# Patient Record
Sex: FEMALE | Race: WHITE | NOT HISPANIC OR LATINO
[De-identification: names, ages, dates, MRNs, and addresses within clinical notes are randomized per-mention and may not be internally consistent; named-entity substitution may affect disease eponyms.]

---

## 2021-03-08 PROBLEM — Z00.00 ENCOUNTER FOR PREVENTIVE HEALTH EXAMINATION: Status: ACTIVE | Noted: 2021-03-08

## 2021-03-15 ENCOUNTER — NON-APPOINTMENT (OUTPATIENT)
Age: 51
End: 2021-03-15

## 2021-03-15 DIAGNOSIS — K25.9 GASTRIC ULCER, UNSPECIFIED AS ACUTE OR CHRONIC, W/OUT HEMORRHAGE OR PERFORATION: ICD-10-CM

## 2021-03-16 DIAGNOSIS — Z01.812 ENCOUNTER FOR PREPROCEDURAL LABORATORY EXAMINATION: ICD-10-CM

## 2021-03-31 ENCOUNTER — TRANSCRIPTION ENCOUNTER (OUTPATIENT)
Age: 51
End: 2021-03-31

## 2021-04-10 ENCOUNTER — APPOINTMENT (OUTPATIENT)
Dept: DISASTER EMERGENCY | Facility: CLINIC | Age: 51
End: 2021-04-10

## 2021-04-10 DIAGNOSIS — Z01.818 ENCOUNTER FOR OTHER PREPROCEDURAL EXAMINATION: ICD-10-CM

## 2021-04-11 LAB — SARS-COV-2 N GENE NPH QL NAA+PROBE: NOT DETECTED

## 2021-04-11 NOTE — PRE PROCEDURE NOTE - PRE PROCEDURE EVALUATION
Attending Physician:     Kayce                       Procedure: EGD EUS    Indication for Procedure: gastric ulcer  ________________________________________________________  PAST MEDICAL & SURGICAL HISTORY:    ALLERGIES:  Allergy Status Unknown    HOME MEDICATIONS:    AICD/PPM: [ ] yes   [ ] no    PERTINENT LAB DATA:                      PHYSICAL EXAMINATION:    T(C): --  HR: --  BP: --  RR: --  SpO2: --    Constitutional: NAD  HEENT: PERRLA, EOMI,    Neck:  No JVD  Respiratory: CTAB/L  Cardiovascular: S1 and S2  Gastrointestinal: BS+, soft, NT/ND  Extremities: No peripheral edema  Neurological: A/O x 3, no focal deficits  Psychiatric: Normal mood, normal affect  Skin: No rashes    ASA Class: I [ ]  II [ ]  III [ ]  IV [ ]    COMMENTS:    The patient is a suitable candidate for the planned procedure unless box checked [ ]  No, explain:

## 2021-04-13 ENCOUNTER — OUTPATIENT (OUTPATIENT)
Dept: OUTPATIENT SERVICES | Facility: HOSPITAL | Age: 51
LOS: 1 days | End: 2021-04-13
Payer: COMMERCIAL

## 2021-04-13 ENCOUNTER — APPOINTMENT (OUTPATIENT)
Dept: GASTROENTEROLOGY | Facility: HOSPITAL | Age: 51
End: 2021-04-13

## 2021-04-13 ENCOUNTER — RESULT REVIEW (OUTPATIENT)
Age: 51
End: 2021-04-13

## 2021-04-13 VITALS
HEART RATE: 76 BPM | SYSTOLIC BLOOD PRESSURE: 138 MMHG | RESPIRATION RATE: 23 BRPM | DIASTOLIC BLOOD PRESSURE: 90 MMHG | OXYGEN SATURATION: 100 %

## 2021-04-13 VITALS
HEIGHT: 62 IN | WEIGHT: 149.91 LBS | HEART RATE: 79 BPM | SYSTOLIC BLOOD PRESSURE: 142 MMHG | RESPIRATION RATE: 13 BRPM | TEMPERATURE: 97 F | DIASTOLIC BLOOD PRESSURE: 81 MMHG | OXYGEN SATURATION: 98 %

## 2021-04-13 DIAGNOSIS — K25.9 GASTRIC ULCER, UNSPECIFIED AS ACUTE OR CHRONIC, WITHOUT HEMORRHAGE OR PERFORATION: ICD-10-CM

## 2021-04-13 DIAGNOSIS — S39.92XA UNSPECIFIED INJURY OF LOWER BACK, INITIAL ENCOUNTER: Chronic | ICD-10-CM

## 2021-04-13 DIAGNOSIS — S43.401A UNSPECIFIED SPRAIN OF RIGHT SHOULDER JOINT, INITIAL ENCOUNTER: Chronic | ICD-10-CM

## 2021-04-13 PROCEDURE — 88305 TISSUE EXAM BY PATHOLOGIST: CPT | Mod: 26

## 2021-04-13 PROCEDURE — 43259 EGD US EXAM DUODENUM/JEJUNUM: CPT | Mod: GC

## 2021-04-13 PROCEDURE — 43239 EGD BIOPSY SINGLE/MULTIPLE: CPT | Mod: GC

## 2021-04-13 RX ORDER — SODIUM CHLORIDE 9 MG/ML
500 INJECTION INTRAMUSCULAR; INTRAVENOUS; SUBCUTANEOUS
Refills: 0 | Status: DISCONTINUED | OUTPATIENT
Start: 2021-04-13 | End: 2021-04-27

## 2021-04-13 NOTE — PRE-ANESTHESIA EVALUATION ADULT - NSANTHOSAYNRD_GEN_A_CORE
No. LEXIS screening performed.  STOP BANG Legend: 0-2 = LOW Risk; 3-4 = INTERMEDIATE Risk; 5-8 = HIGH Risk

## 2021-04-13 NOTE — ASU PATIENT PROFILE, ADULT - PSH
Sprain of right shoulder  rotator cuff repaIr rotator cuff surgery 2014  Unspecified injury of lower back, initial encounter  herniated disc repair surgery

## 2021-04-14 PROCEDURE — 88305 TISSUE EXAM BY PATHOLOGIST: CPT

## 2021-04-14 PROCEDURE — 43259 EGD US EXAM DUODENUM/JEJUNUM: CPT

## 2021-04-14 PROCEDURE — 43239 EGD BIOPSY SINGLE/MULTIPLE: CPT

## 2022-06-05 NOTE — ASU PATIENT PROFILE, ADULT - SPIRITUAL CULTURAL, CURRENT SITUATION, PROFILE
Pt presents to the ED via dizziness. Pt reports that she had a syncopal episode a couple weeks ago after seeing blood. Pt reports feeling like the room is spinning. Pt also felt some weakness and nausea. None

## 2023-02-16 ENCOUNTER — OUTPATIENT (OUTPATIENT)
Dept: OUTPATIENT SERVICES | Facility: HOSPITAL | Age: 53
LOS: 1 days | End: 2023-02-16
Payer: COMMERCIAL

## 2023-02-16 ENCOUNTER — APPOINTMENT (OUTPATIENT)
Dept: NUCLEAR MEDICINE | Facility: HOSPITAL | Age: 53
End: 2023-02-16

## 2023-02-16 DIAGNOSIS — S39.92XA UNSPECIFIED INJURY OF LOWER BACK, INITIAL ENCOUNTER: Chronic | ICD-10-CM

## 2023-02-16 DIAGNOSIS — S43.401A UNSPECIFIED SPRAIN OF RIGHT SHOULDER JOINT, INITIAL ENCOUNTER: Chronic | ICD-10-CM

## 2023-02-16 PROCEDURE — 78830 RP LOCLZJ TUM SPECT W/CT 1: CPT | Mod: 26

## 2023-02-16 PROCEDURE — 78830 RP LOCLZJ TUM SPECT W/CT 1: CPT

## 2023-02-28 ENCOUNTER — TRANSCRIPTION ENCOUNTER (OUTPATIENT)
Age: 53
End: 2023-02-28

## 2023-05-22 ENCOUNTER — INPATIENT (INPATIENT)
Facility: HOSPITAL | Age: 53
LOS: 0 days | Discharge: HOME | DRG: 206 | End: 2023-05-23
Attending: STUDENT IN AN ORGANIZED HEALTH CARE EDUCATION/TRAINING PROGRAM | Admitting: INTERNAL MEDICINE
Payer: COMMERCIAL

## 2023-05-22 VITALS
OXYGEN SATURATION: 100 % | SYSTOLIC BLOOD PRESSURE: 144 MMHG | DIASTOLIC BLOOD PRESSURE: 90 MMHG | RESPIRATION RATE: 18 BRPM | TEMPERATURE: 98 F | HEIGHT: 62 IN | HEART RATE: 76 BPM | WEIGHT: 160.06 LBS

## 2023-05-22 DIAGNOSIS — S43.401A UNSPECIFIED SPRAIN OF RIGHT SHOULDER JOINT, INITIAL ENCOUNTER: Chronic | ICD-10-CM

## 2023-05-22 DIAGNOSIS — Z29.9 ENCOUNTER FOR PROPHYLACTIC MEASURES, UNSPECIFIED: ICD-10-CM

## 2023-05-22 DIAGNOSIS — Z98.890 OTHER SPECIFIED POSTPROCEDURAL STATES: Chronic | ICD-10-CM

## 2023-05-22 DIAGNOSIS — M79.7 FIBROMYALGIA: ICD-10-CM

## 2023-05-22 DIAGNOSIS — R07.9 CHEST PAIN, UNSPECIFIED: ICD-10-CM

## 2023-05-22 DIAGNOSIS — M06.9 RHEUMATOID ARTHRITIS, UNSPECIFIED: ICD-10-CM

## 2023-05-22 DIAGNOSIS — S39.92XA UNSPECIFIED INJURY OF LOWER BACK, INITIAL ENCOUNTER: Chronic | ICD-10-CM

## 2023-05-22 DIAGNOSIS — R06.02 SHORTNESS OF BREATH: ICD-10-CM
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
## 2023-05-22 LAB
ALBUMIN SERPL ELPH-MCNC: 3.9 G/DL — SIGNIFICANT CHANGE UP (ref 3.3–5)
ALP SERPL-CCNC: 62 U/L — SIGNIFICANT CHANGE UP (ref 40–120)
ALT FLD-CCNC: 12 U/L — SIGNIFICANT CHANGE UP (ref 10–45)
ANION GAP SERPL CALC-SCNC: 10 MMOL/L — SIGNIFICANT CHANGE UP (ref 5–17)
AST SERPL-CCNC: 19 U/L — SIGNIFICANT CHANGE UP (ref 10–40)
BASOPHILS # BLD AUTO: 0.04 K/UL — SIGNIFICANT CHANGE UP (ref 0–0.2)
BASOPHILS NFR BLD AUTO: 0.5 % — SIGNIFICANT CHANGE UP (ref 0–2)
BILIRUB SERPL-MCNC: 0.4 MG/DL — SIGNIFICANT CHANGE UP (ref 0.2–1.2)
BUN SERPL-MCNC: 7 MG/DL — SIGNIFICANT CHANGE UP (ref 7–23)
CALCIUM SERPL-MCNC: 8.9 MG/DL — SIGNIFICANT CHANGE UP (ref 8.4–10.5)
CHLORIDE SERPL-SCNC: 104 MMOL/L — SIGNIFICANT CHANGE UP (ref 96–108)
CO2 SERPL-SCNC: 23 MMOL/L — SIGNIFICANT CHANGE UP (ref 22–31)
CREAT SERPL-MCNC: 0.54 MG/DL — SIGNIFICANT CHANGE UP (ref 0.5–1.3)
EGFR: 109 ML/MIN/1.73M2 — SIGNIFICANT CHANGE UP
EOSINOPHIL # BLD AUTO: 0.34 K/UL — SIGNIFICANT CHANGE UP (ref 0–0.5)
EOSINOPHIL NFR BLD AUTO: 4.3 % — SIGNIFICANT CHANGE UP (ref 0–6)
GLUCOSE SERPL-MCNC: 109 MG/DL — HIGH (ref 70–99)
HCT VFR BLD CALC: 38.5 % — SIGNIFICANT CHANGE UP (ref 34.5–45)
HGB BLD-MCNC: 12.9 G/DL — SIGNIFICANT CHANGE UP (ref 11.5–15.5)
IMM GRANULOCYTES NFR BLD AUTO: 0.3 % — SIGNIFICANT CHANGE UP (ref 0–0.9)
LYMPHOCYTES # BLD AUTO: 1.76 K/UL — SIGNIFICANT CHANGE UP (ref 1–3.3)
LYMPHOCYTES # BLD AUTO: 22.1 % — SIGNIFICANT CHANGE UP (ref 13–44)
MCHC RBC-ENTMCNC: 30.9 PG — SIGNIFICANT CHANGE UP (ref 27–34)
MCHC RBC-ENTMCNC: 33.5 GM/DL — SIGNIFICANT CHANGE UP (ref 32–36)
MCV RBC AUTO: 92.1 FL — SIGNIFICANT CHANGE UP (ref 80–100)
MONOCYTES # BLD AUTO: 0.45 K/UL — SIGNIFICANT CHANGE UP (ref 0–0.9)
MONOCYTES NFR BLD AUTO: 5.7 % — SIGNIFICANT CHANGE UP (ref 2–14)
NEUTROPHILS # BLD AUTO: 5.35 K/UL — SIGNIFICANT CHANGE UP (ref 1.8–7.4)
NEUTROPHILS NFR BLD AUTO: 67.1 % — SIGNIFICANT CHANGE UP (ref 43–77)
NRBC # BLD: 0 /100 WBCS — SIGNIFICANT CHANGE UP (ref 0–0)
NT-PROBNP SERPL-SCNC: 121 PG/ML — SIGNIFICANT CHANGE UP (ref 0–300)
PLATELET # BLD AUTO: 321 K/UL — SIGNIFICANT CHANGE UP (ref 150–400)
POTASSIUM SERPL-MCNC: 3.8 MMOL/L — SIGNIFICANT CHANGE UP (ref 3.5–5.3)
POTASSIUM SERPL-SCNC: 3.8 MMOL/L — SIGNIFICANT CHANGE UP (ref 3.5–5.3)
PROT SERPL-MCNC: 6.7 G/DL — SIGNIFICANT CHANGE UP (ref 6–8.3)
RAPID RVP RESULT: SIGNIFICANT CHANGE UP
RBC # BLD: 4.18 M/UL — SIGNIFICANT CHANGE UP (ref 3.8–5.2)
RBC # FLD: 13.3 % — SIGNIFICANT CHANGE UP (ref 10.3–14.5)
SARS-COV-2 RNA SPEC QL NAA+PROBE: SIGNIFICANT CHANGE UP
SODIUM SERPL-SCNC: 137 MMOL/L — SIGNIFICANT CHANGE UP (ref 135–145)
TROPONIN T SERPL-MCNC: 0.01 NG/ML — SIGNIFICANT CHANGE UP (ref 0–0.01)
TROPONIN T SERPL-MCNC: 0.01 NG/ML — SIGNIFICANT CHANGE UP (ref 0–0.01)
WBC # BLD: 7.96 K/UL — SIGNIFICANT CHANGE UP (ref 3.8–10.5)
WBC # FLD AUTO: 7.96 K/UL — SIGNIFICANT CHANGE UP (ref 3.8–10.5)

## 2023-05-22 PROCEDURE — 99223 1ST HOSP IP/OBS HIGH 75: CPT | Mod: GC

## 2023-05-22 PROCEDURE — 71275 CT ANGIOGRAPHY CHEST: CPT | Mod: 26,MA

## 2023-05-22 PROCEDURE — 71045 X-RAY EXAM CHEST 1 VIEW: CPT | Mod: 26

## 2023-05-22 PROCEDURE — 99285 EMERGENCY DEPT VISIT HI MDM: CPT

## 2023-05-22 RX ORDER — KETOROLAC TROMETHAMINE 30 MG/ML
30 SYRINGE (ML) INJECTION ONCE
Refills: 0 | Status: DISCONTINUED | OUTPATIENT
Start: 2023-05-22 | End: 2023-05-22

## 2023-05-22 RX ORDER — IPRATROPIUM/ALBUTEROL SULFATE 18-103MCG
3 AEROSOL WITH ADAPTER (GRAM) INHALATION EVERY 6 HOURS
Refills: 0 | Status: DISCONTINUED | OUTPATIENT
Start: 2023-05-22 | End: 2023-05-23

## 2023-05-22 RX ORDER — CELECOXIB 200 MG/1
200 CAPSULE ORAL DAILY
Refills: 0 | Status: DISCONTINUED | OUTPATIENT
Start: 2023-05-22 | End: 2023-05-23

## 2023-05-22 RX ORDER — FAMOTIDINE 10 MG/ML
1 INJECTION INTRAVENOUS
Qty: 0 | Refills: 0 | DISCHARGE

## 2023-05-22 RX ORDER — IPRATROPIUM/ALBUTEROL SULFATE 18-103MCG
3 AEROSOL WITH ADAPTER (GRAM) INHALATION ONCE
Refills: 0 | Status: COMPLETED | OUTPATIENT
Start: 2023-05-22 | End: 2023-05-22

## 2023-05-22 RX ORDER — ACETAMINOPHEN 500 MG
1000 TABLET ORAL ONCE
Refills: 0 | Status: COMPLETED | OUTPATIENT
Start: 2023-05-22 | End: 2023-05-22

## 2023-05-22 RX ORDER — ACETAMINOPHEN 500 MG
650 TABLET ORAL EVERY 6 HOURS
Refills: 0 | Status: DISCONTINUED | OUTPATIENT
Start: 2023-05-22 | End: 2023-05-23

## 2023-05-22 RX ORDER — DEXLANSOPRAZOLE 30 MG/1
1 CAPSULE, DELAYED RELEASE ORAL
Qty: 0 | Refills: 0 | DISCHARGE

## 2023-05-22 RX ORDER — PANTOPRAZOLE SODIUM 20 MG/1
40 TABLET, DELAYED RELEASE ORAL
Refills: 0 | Status: DISCONTINUED | OUTPATIENT
Start: 2023-05-22 | End: 2023-05-23

## 2023-05-22 RX ORDER — TRAMADOL HYDROCHLORIDE 50 MG/1
25 TABLET ORAL EVERY 12 HOURS
Refills: 0 | Status: DISCONTINUED | OUTPATIENT
Start: 2023-05-22 | End: 2023-05-23

## 2023-05-22 RX ORDER — HYDROXYCHLOROQUINE SULFATE 200 MG
1 TABLET ORAL
Refills: 0 | DISCHARGE

## 2023-05-22 RX ORDER — HYDROXYCHLOROQUINE SULFATE 200 MG
200 TABLET ORAL DAILY
Refills: 0 | Status: DISCONTINUED | OUTPATIENT
Start: 2023-05-22 | End: 2023-05-23

## 2023-05-22 RX ORDER — SUCRALFATE 1 G
0 TABLET ORAL
Qty: 0 | Refills: 0 | DISCHARGE

## 2023-05-22 RX ADMIN — Medication 3 MILLILITER(S): at 13:49

## 2023-05-22 RX ADMIN — Medication 400 MILLIGRAM(S): at 17:49

## 2023-05-22 RX ADMIN — Medication 1 MILLIGRAM(S): at 20:18

## 2023-05-22 RX ADMIN — Medication 30 MILLIGRAM(S): at 17:51

## 2023-05-22 RX ADMIN — Medication 40 MILLIGRAM(S): at 20:17

## 2023-05-22 RX ADMIN — Medication 3 MILLILITER(S): at 13:29

## 2023-05-22 RX ADMIN — Medication 125 MILLIGRAM(S): at 13:29

## 2023-05-22 RX ADMIN — Medication 3 MILLILITER(S): at 20:23

## 2023-05-22 RX ADMIN — Medication 3 MILLILITER(S): at 14:09

## 2023-05-22 NOTE — H&P ADULT - ASSESSMENT
53F PMH of RA and fibromyalgia who presents with 2 days of acute onset chest pain and SOB, admitted for further evaluation and management.

## 2023-05-22 NOTE — H&P ADULT - PROBLEM SELECTOR PLAN 1
Patient presenting with acute onset chest pain and difficulty breathing x2 days. Reports chest pain is dull, localized to center of chest and worse with inspiration. Associated SOB and non-productive cough. No personal history of asthma but does have family history of asthma. Never smoker. Denies chemical or fume exposure in past.  -CBC and CMP wnl  -ESR mildly elevated at 36; CRP wnl   -trops negative; proBNP wnl   -RVP negative   -peak flow rate in    -EKG NSR  -CXR no acute pathology  -CTA no PE or consolidation; Left lung base linear atelectasis  -s/p IV tylenol 1g x1, toradol 30mg IVP x1, ativan 1mg IVP x1, solumedrol 125mg IVP x1, prednisone 40mg PO x1, and duonebs x4 in ED with minimal improvement in symptoms   -unclear etiology at this time however ddx includes asthma/reactive airway disease, pericarditis, possible small PE not seen on CT scan, GERD/esophageal spasm, fibromyalgia pain, possible autoimmune etiology, pulm fibrosis related to RA  -f/u TTE to r/o cardiac cause   -monitor peak expiratory flow rate daily   -consider pulm consult in AM   -tylenol prn pain Patient presenting with acute onset chest pain and difficulty breathing x2 days. Reports chest pain is dull, localized to center of chest and worse with inspiration. Associated SOB and non-productive cough. No personal history of asthma but does have family history of asthma. Never smoker. Denies chemical or fume exposure in past.  -CBC and CMP wnl  -ESR mildly elevated at 36; CRP wnl   -trops negative; proBNP wnl   -RVP negative   -peak flow rate in    -EKG NSR  -CXR no acute pathology  -CTA no PE or consolidation; Left lung base linear atelectasis  -s/p IV tylenol 1g x1, toradol 30mg IVP x1, ativan 1mg IVP x1, solumedrol 125mg IVP x1, prednisone 40mg PO x1, and duonebs x4 in ED with minimal improvement in symptoms   -unclear etiology at this time however ddx includes asthma/reactive airway disease, pericarditis, possible small PE not seen on CT scan, GERD/esophageal spasm, fibromyalgia pain, possible autoimmune etiology, pulm fibrosis related to RA  -f/u TTE to r/o cardiac cause   -f/u CONSUELO, RF, scleroderma Abs   -monitor peak expiratory flow rate daily   -consider pulm consult in AM   -tylenol prn pain Patient presenting with acute onset chest pain and difficulty breathing x2 days. Reports SOB worse with exertion and associated with dry cough. No personal history of asthma but does have family history of asthma. Never smoker. Denies chemical or fume exposure in past.  -CBC and CMP wnl  -ESR mildly elevated at 36; CRP wnl   -trops negative; proBNP wnl   -RVP negative   -peak flow rate in    -EKG NSR   -CXR no acute pathology  -CTA no PE or consolidation; Left lung base linear atelectasis  -s/p IV tylenol 1g x1, toradol 30mg IVP x1, ativan 1mg IVP x1, solumedrol 125mg IVP x1, prednisone 40mg PO x1, and duonebs x4 in ED with minimal improvement in symptoms   -unclear etiology at this time however ddx includes asthma/bronchitis/reactive airway disease, viral infection, pericarditis, possible small PE not seen on CT scan, GERD/esophageal spasm, fibromyalgia pain, possible autoimmune etiology, pulm fibrosis related to RA  -f/u CONSUELO, RF, scleroderma Abs, anti-dsDNA  -monitor peak expiratory flow rate daily   -consider pulm consult in AM   -tessalon prn cough   -duonebs q6 standing   -incentive spirometry   -hold off on high dose steroids for now, can consider adding if symptoms worsen

## 2023-05-22 NOTE — ED PROVIDER NOTE - CLINICAL SUMMARY MEDICAL DECISION MAKING FREE TEXT BOX
54 y/o F with atypical chest pain, SOB, and dry nonproductive cough x3 days. Pt found to have new wheezing on exam with no personal h/o reactive airway disease. Suspect viral illness with reactive airway. Also consider autoimmune process. Pt empirically given nebs and steroids. EKG is nonischemic and troponin is negative, doubt ACS. CXR with no pneumonia and ?nodule. If radiologist agrees, will consider CT to evaluate this. D-dimer sent. If elevated, will send for CTA to evaluate for PE. Dispo pending work up and reevaluation. 54 y/o F with atypical chest pain, SOB, and dry nonproductive cough x3 days. Pt found to have new wheezing on exam with no personal h/o reactive airway disease. Suspect viral illness with reactive airway. Also consider autoimmune process. Pt empirically given nebs and steroids. EKG is nonischemic and troponin is negative, doubt ACS. CXR with no pneumonia and ?nodule. If radiologist agrees, will consider CT to evaluate this. D-dimer sent. If elevated, will send for CTA to evaluate for PE. Dispo pending work up and reevaluation.    repeat exam wheezing mostly resolved but heart at bases  , now w diffuse rhonchi, pt still appears dyspneic, pt waiting for CTA to r/o PE, added on BNP for further CHF evaluation, consider pericarditis, , ESR elevated.   dispo pending CT and reevaluation. 54 y/o F with atypical chest pain, SOB, and dry nonproductive cough x3 days. Pt found to have new wheezing on exam with no personal h/o reactive airway disease. Suspect viral illness with reactive airway. Also consider autoimmune process. Pt empirically given nebs and steroids. EKG is nonischemic and troponin is negative, doubt ACS. CXR with no pneumonia and ?nodule. If radiologist agrees, will consider CT to evaluate this. D-dimer sent. If elevated, will send for CTA to evaluate for PE. Dispo pending work up and reevaluation.    repeat exam wheezing mostly resolved but heart at bases  , now w diffuse rhonchi, pt still appears dyspneic, pt waiting for CTA to r/o PE, added on BNP for further CHF evaluation, consider pericarditis, , ESR elevated.   dispo pending CT and reevaluation.    pt remains dyspneic , tachy w exertion, will admit, discussed w cardiology who advised ok for floor, would consider echo, possible further ACS eval, pulm evaluation.

## 2023-05-22 NOTE — ED ADULT NURSE NOTE - OBJECTIVE STATEMENT
AOX4. VSS. Patient came to the ED from [facility], after complaining of chest pain and occasional SOB for the past two days. Denies cardiac history.

## 2023-05-22 NOTE — H&P ADULT - NSICDXPASTSURGICALHX_GEN_ALL_CORE_FT
PAST SURGICAL HISTORY:  S/P discectomy     S/P rotator cuff surgery     Sprain of right shoulder rotator cuff repaIr rotator cuff surgery 2014    Unspecified injury of lower back, initial encounter herniated disc repair surgery

## 2023-05-22 NOTE — H&P ADULT - PROBLEM SELECTOR PLAN 4
F: none   E: replete as needed  N: regular diet   DVT ppx:   Full Code  Dispo: [facility] Patient with history of fibromyalgia on home meloxicam 15mg qd and pregabalin 25mg qd   -c/w home meloxicam and pregabalin Patient with history of fibromyalgia on home meloxicam 15mg qd and pregabalin 25mg qd   -c/w home meloxicam (celecoxib therapeutic interchange) and pregabalin

## 2023-05-22 NOTE — ED PROVIDER NOTE - NSICDXPASTSURGICALHX_GEN_ALL_CORE_FT
PAST SURGICAL HISTORY:  Sprain of right shoulder rotator cuff repaIr rotator cuff surgery 2014    Unspecified injury of lower back, initial encounter herniated disc repair surgery

## 2023-05-22 NOTE — H&P ADULT - NSHPLABSRESULTS_GEN_ALL_CORE
.  LABS:                         12.9   7.96  )-----------( 321      ( 22 May 2023 12:06 )             38.5     05-22    137  |  104  |  7   ----------------------------<  109<H>  3.8   |  23  |  0.54    Ca    8.9      22 May 2023 12:06    TPro  6.7  /  Alb  3.9  /  TBili  0.4  /  DBili  x   /  AST  19  /  ALT  12  /  AlkPhos  62  05-22    PT/INR - ( 22 May 2023 12:06 )   PT: 11.1 sec;   INR: 0.93          PTT - ( 22 May 2023 12:06 )  PTT:32.1 sec          RADIOLOGY, EKG & ADDITIONAL TESTS: Reviewed.

## 2023-05-22 NOTE — H&P ADULT - NSHPPHYSICALEXAM_GEN_ALL_CORE
VITALS:   T(C): 36.7 (05-22-23 @ 20:05), Max: 37.2 (05-22-23 @ 13:46)  HR: 123 (05-22-23 @ 20:05) (75 - 123)  BP: 132/82 (05-22-23 @ 20:05) (132/82 - 155/88)  RR: 26 (05-22-23 @ 20:05) (18 - 26)  SpO2: 99% (05-22-23 @ 20:05) (99% - 100%)    GENERAL: NAD, lying in bed comfortably  HEAD:  Atraumatic, normocephalic  EYES: EOMI, PERRLA, conjunctiva and sclera clear  ENT: Moist mucous membranes  NECK: Supple, no JVD  HEART: Regular rate and rhythm, no murmurs, rubs, or gallops  LUNGS: Comfortable on RA; no tachypnea however + conversational dyspnea. Deep expiratory wheezes vs ?rhonchi b/l.  ABDOMEN: Soft, nontender, nondistended, +BS  EXTREMITIES: 2+ peripheral pulses bilaterally. No clubbing, cyanosis, or edema  NERVOUS SYSTEM:  A&Ox3, no focal deficits   SKIN: No rashes or lesions

## 2023-05-22 NOTE — H&P ADULT - PROBLEM SELECTOR PLAN 2
Patient with history of RA on home hydroxychloroquine 200mg qd and methylprednisolone 4mg qd   -c/w home hydroxychloroquine and methylprednisolone Patient presenting with acute onset chest pain and difficulty breathing x2 days. Reports chest pain is dull, localized to center of chest and worse with inspiration. Associated SOB and non-productive cough.   -CBC and CMP wnl  -ESR mildly elevated at 36; CRP wnl   -trops negative; proBNP wnl   -RVP negative   -peak flow rate in    -EKG NSR  -CXR no acute pathology  -CTA no PE or consolidation; Left lung base linear atelectasis  -s/p IV tylenol 1g x1, toradol 30mg IVP x1, ativan 1mg IVP x1, solumedrol 125mg IVP x1, prednisone 40mg PO x1, and duonebs x4 in ED with minimal improvement in symptoms   -unclear etiology at this time, very low suspicion for ACS; however ddx includes asthma/reactive airway disease, viral infection, pericarditis, possible small PE not seen on CT scan, GERD/esophageal spasm, fibromyalgia pain, possible autoimmune etiology, pulm fibrosis related to RA  -f/u TTE to r/o cardiac cause   -f/u CONSUELO, RF, scleroderma Abs, anti-dsDNA  -tylenol prn mild pain, tramadol prn moderate pain  -PPI daily Patient presenting with acute onset chest pain and difficulty breathing x2 days. Reports chest pain is dull, localized to center of chest and worse with inspiration. Associated SOB and non-productive cough.   -CBC and CMP wnl  -ESR mildly elevated at 36; CRP wnl   -trops negative; proBNP wnl   -RVP negative   -EKG NSR  -CXR no acute pathology  -CTA no PE or consolidation; Left lung base linear atelectasis  -s/p IV tylenol 1g x1, toradol 30mg IVP x1, ativan 1mg IVP x1, solumedrol 125mg IVP x1, prednisone 40mg PO x1, and duonebs x4 in ED with minimal improvement in symptoms   -unclear etiology at this time, very low suspicion for ACS; however ddx includes asthma/reactive airway disease, viral infection, pericarditis, possible small PE not seen on CT scan, GERD/esophageal spasm, fibromyalgia pain, possible autoimmune etiology, pulm fibrosis related to RA  -f/u TTE to r/o cardiac cause   -f/u CONSUELO, RF, scleroderma Abs, anti-dsDNA  -tylenol prn mild pain, tramadol prn moderate pain  -PPI daily Patient presenting with acute onset chest pain and difficulty breathing x2 days. Reports chest pain is dull, localized to center of chest and worse with inspiration. Associated SOB and non-productive cough.   -CBC and CMP wnl  -ESR mildly elevated at 36; CRP wnl   -trops negative; proBNP wnl   -RVP negative   -EKG NSR  -CXR no acute pathology  -CTA no PE or consolidation; Left lung base linear atelectasis  -s/p IV tylenol 1g x1, toradol 30mg IVP x1, ativan 1mg IVP x1, solumedrol 125mg IVP x1, prednisone 40mg PO x1, and duonebs x4 in ED with minimal improvement in symptoms   -unclear etiology at this time, very low suspicion for ACS; however ddx includes asthma/reactive airway disease, viral infection, costochondritis/MSK, pericarditis, possible small PE not seen on CT scan, GERD/esophageal spasm, fibromyalgia pain, possible autoimmune etiology, pulm fibrosis related to RA  -f/u TTE to r/o cardiac cause   -f/u CONSUELO, RF, scleroderma Abs, anti-dsDNA  -tylenol prn mild pain, tramadol prn moderate pain  -PPI daily

## 2023-05-22 NOTE — H&P ADULT - PROBLEM SELECTOR PLAN 3
Patient with history of fibromyalgia on home meloxicam 15mg qd and pregabalin 25mg qd   -c/w home meloxicam and pregabalin Patient with history of RA on home hydroxychloroquine 200mg qd and methylprednisolone 4mg qd   -c/w home hydroxychloroquine and methylprednisolone

## 2023-05-22 NOTE — ED ADULT NURSE NOTE - CHIEF COMPLAINT QUOTE
sent by [facility] for chest pain and sob with exertion x2 days. respirations equal and unlabored.

## 2023-05-22 NOTE — H&P ADULT - HISTORY OF PRESENT ILLNESS
53F PMH of RA and fibromyalgia who presents with 2 days of acute onset chest pain and SOB. Patient reports saturday night she was at home with her  putting up wallpaper when she suddenly started to experience chest pain and difficulty breathing. She reports the chest pain is dull, non-radiating, localized to the center of her chest, and is worse with inspiration. Says it feels like her chest is congested. Also thinks SOB worse with exertion. Reports the pain is constant however did not get progressively worse; just stayed the same and did not go away. She tried to see her PCP however PCP was away on vacation so she went to urgent care who did CXR and EKG- both were normal so they referred her to the ED. Says this has never happened to her before. She has been taking advil at home for the pain without relief. Also reports associated dry cough since saturday as well. Denies any fevers, chills, headache, vision changes, sore throat, abd pain, N/V/D/C, dysuria, trouble urinating, orthopnea, extremity pain or swelling. Denies personal history of asthma however reports both her mother and brother have asthma. Denies any allergies. Denies any recent travel, sick contacts. Denies any previous chemical or fume exposures. Nonsmoker.     In the ED:   Initial vitals: Temp 98.2F, HR 76, /90, RR 18-->26, O2 100% RA   Labs significant for: ESR 36, d-dimer 292, CRP wnl, trops and proBNP wnl; RVP negative   CXR: no acute pathology; increased soft tissue opacity superior to the right clavicle which may be compositional.   EKG: NSR, no ST-T changes   CTA: no PE;  patent central airways. Left lung base linear atelectasis. No consolidation. Few hepatic cysts.   Medications: IV tylenol 1g x1, toradol 30mg IVP x1, ativan 1mg IVP x1, solumedrol 125mg IVP x1, prednisone 40mg PO x1, duonebs x4

## 2023-05-22 NOTE — ED PROVIDER NOTE - PHYSICAL EXAMINATION
VITAL SIGNS: I have reviewed nursing notes and confirm.  CONSTITUTIONAL: Well-developed; in no acute distress.  SKIN: Agree with RN documentation regarding decubitus evaluation. Remainder of skin exam is warm and dry, no acute rash.  HEAD: Normocephalic; atraumatic.  EYES: PERRL, EOM intact; conjunctiva and sclera clear.  ENT: No nasal discharge; airway clear.  NECK: Supple; non tender.  CARD: S1, S2 normal; no murmurs, gallops, or rubs. Regular rate and rhythm.  RESP: Diffuse wheezing and rhonchi. Conversational dyspnea.   ABD: Normal bowel sounds; soft; non-distended; non-tender; no hepatosplenomegaly.  EXT: Normal ROM. No clubbing, cyanosis or edema.  LYMPH: No acute cervical adenopathy.  NEURO: Alert, oriented. Grossly unremarkable.  PSYCH: Cooperative, appropriate.

## 2023-05-22 NOTE — H&P ADULT - ATTENDING COMMENTS
#SOB/SAPP: p/w SOB/SAPP, cough, throat pain and pleuritic CP. +wheezing on exam in ED. No hx of Asthma/COPD/smoking. Euvolemic. CTA neg for PE. EKG nsr, non ischemic, mild esr/crp neg. Low suspicion for pericarditis, ACS. Improved s/p steroids/dounebs. ??acute bronchitis/ reactive airway dx 2/2 viral uri. RVP wnl. c/w dounebs, home steroids- consider increase to pred 40 if wheezing/sob continues, supportive care, f/up TTE. Monitor resp status.    #Pleuritic CP: localized mid sternal CP, worse w/ exertion/cough/depp inspiration. +wheezing, euvolemic on exam. EKG non ischemic. Low suspicion for pericarditis. Denies GERD, no odynophagia. ?costochondritis. Plan as above. Pain control, IS.

## 2023-05-22 NOTE — H&P ADULT - PROBLEM SELECTOR PLAN 5
F: none   E: replete as needed  N: regular diet   DVT ppx: lovenox   Full Code  Dispo: [facility] F: none   E: replete as needed  N: regular diet   DVT ppx: not needed   Full Code  Dispo: [facility]

## 2023-05-22 NOTE — H&P ADULT - NSHPSOCIALHISTORY_GEN_ALL_CORE
Patient lives at home with  and 2 kids; works in finance   Ambulates independently   Never smoker  Denies alcohol or recreational drug use

## 2023-05-22 NOTE — ED PROVIDER NOTE - OBJECTIVE STATEMENT
54 y/o F nonsmoker with PMHx fibromyalgia and RA presents to ED with 3 days of dry nonproductive cough and constant chest tightness with SOB. Pain is not exertional and not fluctuating. Discomfort worsens when leaning forward. Denies fevers, congestion, sore throat, headache, N/V/D, or person h/o asthma.

## 2023-05-22 NOTE — ED PROVIDER NOTE - WR INTERPRETATION 1
no consolidation or effusion, nl heart nl bones, questionable nodule vs prominent lung markings rt side.

## 2023-05-23 ENCOUNTER — TRANSCRIPTION ENCOUNTER (OUTPATIENT)
Age: 53
End: 2023-05-23

## 2023-05-23 VITALS
DIASTOLIC BLOOD PRESSURE: 75 MMHG | SYSTOLIC BLOOD PRESSURE: 124 MMHG | OXYGEN SATURATION: 98 % | HEART RATE: 94 BPM | RESPIRATION RATE: 20 BRPM | TEMPERATURE: 98 F

## 2023-05-23 LAB
ALBUMIN SERPL ELPH-MCNC: 3.8 G/DL — SIGNIFICANT CHANGE UP (ref 3.3–5)
ALP SERPL-CCNC: 60 U/L — SIGNIFICANT CHANGE UP (ref 40–120)
ALT FLD-CCNC: 12 U/L — SIGNIFICANT CHANGE UP (ref 10–45)
ANION GAP SERPL CALC-SCNC: 15 MMOL/L — SIGNIFICANT CHANGE UP (ref 5–17)
AST SERPL-CCNC: 17 U/L — SIGNIFICANT CHANGE UP (ref 10–40)
BASOPHILS # BLD AUTO: 0.02 K/UL — SIGNIFICANT CHANGE UP (ref 0–0.2)
BASOPHILS NFR BLD AUTO: 0.1 % — SIGNIFICANT CHANGE UP (ref 0–2)
BILIRUB SERPL-MCNC: 0.3 MG/DL — SIGNIFICANT CHANGE UP (ref 0.2–1.2)
BUN SERPL-MCNC: 10 MG/DL — SIGNIFICANT CHANGE UP (ref 7–23)
CALCIUM SERPL-MCNC: 9.2 MG/DL — SIGNIFICANT CHANGE UP (ref 8.4–10.5)
CHLORIDE SERPL-SCNC: 103 MMOL/L — SIGNIFICANT CHANGE UP (ref 96–108)
CO2 SERPL-SCNC: 17 MMOL/L — LOW (ref 22–31)
CREAT SERPL-MCNC: 0.48 MG/DL — LOW (ref 0.5–1.3)
EGFR: 112 ML/MIN/1.73M2 — SIGNIFICANT CHANGE UP
ENA SCL70 AB SER-ACNC: <0.2 AI — SIGNIFICANT CHANGE UP
EOSINOPHIL # BLD AUTO: 0 K/UL — SIGNIFICANT CHANGE UP (ref 0–0.5)
EOSINOPHIL NFR BLD AUTO: 0 % — SIGNIFICANT CHANGE UP (ref 0–6)
GLUCOSE SERPL-MCNC: 145 MG/DL — HIGH (ref 70–99)
HCT VFR BLD CALC: 38.5 % — SIGNIFICANT CHANGE UP (ref 34.5–45)
HGB BLD-MCNC: 13 G/DL — SIGNIFICANT CHANGE UP (ref 11.5–15.5)
IMM GRANULOCYTES NFR BLD AUTO: 0.7 % — SIGNIFICANT CHANGE UP (ref 0–0.9)
LYMPHOCYTES # BLD AUTO: 1.12 K/UL — SIGNIFICANT CHANGE UP (ref 1–3.3)
LYMPHOCYTES # BLD AUTO: 7.1 % — LOW (ref 13–44)
MAGNESIUM SERPL-MCNC: 1.9 MG/DL — SIGNIFICANT CHANGE UP (ref 1.6–2.6)
MCHC RBC-ENTMCNC: 31.1 PG — SIGNIFICANT CHANGE UP (ref 27–34)
MCHC RBC-ENTMCNC: 33.8 GM/DL — SIGNIFICANT CHANGE UP (ref 32–36)
MCV RBC AUTO: 92.1 FL — SIGNIFICANT CHANGE UP (ref 80–100)
MONOCYTES # BLD AUTO: 0.43 K/UL — SIGNIFICANT CHANGE UP (ref 0–0.9)
MONOCYTES NFR BLD AUTO: 2.7 % — SIGNIFICANT CHANGE UP (ref 2–14)
NEUTROPHILS # BLD AUTO: 14.19 K/UL — HIGH (ref 1.8–7.4)
NEUTROPHILS NFR BLD AUTO: 89.4 % — HIGH (ref 43–77)
NRBC # BLD: 0 /100 WBCS — SIGNIFICANT CHANGE UP (ref 0–0)
PHOSPHATE SERPL-MCNC: 3.1 MG/DL — SIGNIFICANT CHANGE UP (ref 2.5–4.5)
PLATELET # BLD AUTO: 360 K/UL — SIGNIFICANT CHANGE UP (ref 150–400)
POTASSIUM SERPL-MCNC: 3.4 MMOL/L — LOW (ref 3.5–5.3)
POTASSIUM SERPL-SCNC: 3.4 MMOL/L — LOW (ref 3.5–5.3)
PROT SERPL-MCNC: 7 G/DL — SIGNIFICANT CHANGE UP (ref 6–8.3)
RBC # BLD: 4.18 M/UL — SIGNIFICANT CHANGE UP (ref 3.8–5.2)
RBC # FLD: 13.7 % — SIGNIFICANT CHANGE UP (ref 10.3–14.5)
RHEUMATOID FACT SERPL-ACNC: <10 IU/ML — SIGNIFICANT CHANGE UP (ref 0–13)
SODIUM SERPL-SCNC: 135 MMOL/L — SIGNIFICANT CHANGE UP (ref 135–145)
WBC # BLD: 15.87 K/UL — HIGH (ref 3.8–10.5)
WBC # FLD AUTO: 15.87 K/UL — HIGH (ref 3.8–10.5)

## 2023-05-23 PROCEDURE — 86431 RHEUMATOID FACTOR QUANT: CPT

## 2023-05-23 PROCEDURE — 85379 FIBRIN DEGRADATION QUANT: CPT

## 2023-05-23 PROCEDURE — 71275 CT ANGIOGRAPHY CHEST: CPT | Mod: MA

## 2023-05-23 PROCEDURE — 84100 ASSAY OF PHOSPHORUS: CPT

## 2023-05-23 PROCEDURE — 71045 X-RAY EXAM CHEST 1 VIEW: CPT

## 2023-05-23 PROCEDURE — 94640 AIRWAY INHALATION TREATMENT: CPT

## 2023-05-23 PROCEDURE — 85652 RBC SED RATE AUTOMATED: CPT

## 2023-05-23 PROCEDURE — 99285 EMERGENCY DEPT VISIT HI MDM: CPT | Mod: 25

## 2023-05-23 PROCEDURE — 84484 ASSAY OF TROPONIN QUANT: CPT

## 2023-05-23 PROCEDURE — 86235 NUCLEAR ANTIGEN ANTIBODY: CPT

## 2023-05-23 PROCEDURE — 85730 THROMBOPLASTIN TIME PARTIAL: CPT

## 2023-05-23 PROCEDURE — 93306 TTE W/DOPPLER COMPLETE: CPT

## 2023-05-23 PROCEDURE — 80053 COMPREHEN METABOLIC PANEL: CPT

## 2023-05-23 PROCEDURE — 85025 COMPLETE CBC W/AUTO DIFF WBC: CPT

## 2023-05-23 PROCEDURE — 0225U NFCT DS DNA&RNA 21 SARSCOV2: CPT

## 2023-05-23 PROCEDURE — 93306 TTE W/DOPPLER COMPLETE: CPT | Mod: 26

## 2023-05-23 PROCEDURE — 99239 HOSP IP/OBS DSCHRG MGMT >30: CPT | Mod: GC

## 2023-05-23 PROCEDURE — 36415 COLL VENOUS BLD VENIPUNCTURE: CPT

## 2023-05-23 PROCEDURE — 85610 PROTHROMBIN TIME: CPT

## 2023-05-23 PROCEDURE — 86140 C-REACTIVE PROTEIN: CPT

## 2023-05-23 PROCEDURE — 86225 DNA ANTIBODY NATIVE: CPT

## 2023-05-23 PROCEDURE — 83880 ASSAY OF NATRIURETIC PEPTIDE: CPT

## 2023-05-23 PROCEDURE — 83735 ASSAY OF MAGNESIUM: CPT

## 2023-05-23 PROCEDURE — 96374 THER/PROPH/DIAG INJ IV PUSH: CPT

## 2023-05-23 PROCEDURE — 86038 ANTINUCLEAR ANTIBODIES: CPT

## 2023-05-23 PROCEDURE — 96375 TX/PRO/DX INJ NEW DRUG ADDON: CPT

## 2023-05-23 RX ORDER — POTASSIUM CHLORIDE 20 MEQ
40 PACKET (EA) ORAL ONCE
Refills: 0 | Status: COMPLETED | OUTPATIENT
Start: 2023-05-23 | End: 2023-05-23

## 2023-05-23 RX ORDER — MELOXICAM 15 MG/1
1 TABLET ORAL
Qty: 0 | Refills: 0 | DISCHARGE

## 2023-05-23 RX ORDER — ALBUTEROL 90 UG/1
2 AEROSOL, METERED ORAL
Qty: 1 | Refills: 0
Start: 2023-05-23

## 2023-05-23 RX ORDER — MELOXICAM 15 MG/1
1 TABLET ORAL
Refills: 0 | DISCHARGE

## 2023-05-23 RX ORDER — NYSTATIN 500MM UNIT
500000 POWDER (EA) MISCELLANEOUS THREE TIMES A DAY
Refills: 0 | Status: DISCONTINUED | OUTPATIENT
Start: 2023-05-23 | End: 2023-05-23

## 2023-05-23 RX ADMIN — CELECOXIB 200 MILLIGRAM(S): 200 CAPSULE ORAL at 11:17

## 2023-05-23 RX ADMIN — Medication 40 MILLIEQUIVALENT(S): at 11:17

## 2023-05-23 RX ADMIN — Medication 25 MILLIGRAM(S): at 11:17

## 2023-05-23 RX ADMIN — PANTOPRAZOLE SODIUM 40 MILLIGRAM(S): 20 TABLET, DELAYED RELEASE ORAL at 06:11

## 2023-05-23 RX ADMIN — Medication 3 MILLILITER(S): at 06:10

## 2023-05-23 RX ADMIN — Medication 500000 UNIT(S): at 13:06

## 2023-05-23 RX ADMIN — Medication 200 MILLIGRAM(S): at 11:17

## 2023-05-23 RX ADMIN — Medication 3 MILLILITER(S): at 12:21

## 2023-05-23 RX ADMIN — Medication 4 MILLIGRAM(S): at 06:10

## 2023-05-23 NOTE — DISCHARGE NOTE PROVIDER - CARE PROVIDER_API CALL
SIRIA RAMOS  Internal Medicine  [address] [place] [address]  Phone: [phone]  Fax: [phone]  Follow Up Time:

## 2023-05-23 NOTE — DISCHARGE NOTE PROVIDER - NSDCMRMEDTOKEN_GEN_ALL_CORE_FT
albuterol 90 mcg/inh inhalation aerosol: 2 puff(s) inhaled 2 times a day as needed for  cough  hydroxychloroquine 200 mg oral tablet: 1 orally once a day  meloxicam 15 mg oral tablet: 1 orally once a day  methylPREDNISolone 4 mg oral tablet: orally once a day  pregabalin 25 mg oral capsule:

## 2023-05-23 NOTE — DISCHARGE NOTE NURSING/CASE MANAGEMENT/SOCIAL WORK - PATIENT PORTAL LINK FT
You can access the FollowMyHealth Patient Portal offered by [facility] by registering at the following website: [email or web address]. By joining [email or web address]’s FollowMyHealth portal, you will also be able to view your health information using other applications (apps) compatible with our system.

## 2023-05-23 NOTE — DISCHARGE NOTE PROVIDER - HOSPITAL COURSE
#Discharge: do not delete    53F PMH of RA and fibromyalgia who presents with 2 days of acute onset chest pain and SOB, admitted for further evaluation and management.       #Pleuritic chest pain and SOB  Patient presenting with acute onset chest pain and difficulty breathing x2 days. Reports SOB worse with exertion and associated with dry cough. No personal history of asthma but does have family history of asthma. Never smoker. Possible chemical/fume exposure after wallpapering this weekend. No PE on imaging, no infectious process on CXR. Labs unremarkable. Unclear etiology however given pain reproducible on exam with cough over weekend, likely costocondritis exacerbated by fibromyalgia pain. Pericarditis unlikely as no EKG changes, no change while leaning forward, to pericardial friction rub appreciated on exam. TTE negative for pericardial effusion.    - will prescribe albuterol inhaler on discharge to help with respiratory symptoms  - advise patient to continue with home meloxicam, if symptoms worsen or do not improve in one week should see PCP for f/u    #Rheumatoid arthritis.   Patient with history of RA on home hydroxychloroquine 200mg qd and methylprednisolone 4mg qd   -c/w home hydroxychloroquine and methylprednisolone.    #Fibromyalgia.   Patient with history of fibromyalgia on home meloxicam 15mg qd and pregabalin 25mg qd   -c/w home medications      Patient was discharged to: home    New medications: albuterol inhaler  Changes to old medications: none  Medications that were stopped: none    Items to follow up as outpatient: f/u with PCP in 1 week    Physical exam at the time of discharge:  GENERAL: NAD, lying in bed comfortably  HEAD:  Atraumatic, normocephalic  EYES: EOMI, PERRLA, conjunctiva and sclera clear  ENT: Moist mucous membranes  NECK: Supple, no JVD  HEART: Regular rate and rhythm, no murmurs, rubs, or gallops  LUNGS: Comfortable on RA; no tachypnea however + conversational dyspnea. Deep expiratory wheezes vs ?rhonchi b/l.  ABDOMEN: Soft, nontender, nondistended, +BS  EXTREMITIES: 2+ peripheral pulses bilaterally. No clubbing, cyanosis, or edema  NERVOUS SYSTEM:  A&Ox3, no focal deficits   SKIN: No rashes or lesions

## 2023-05-23 NOTE — DISCHARGE NOTE PROVIDER - NSDCCPCAREPLAN_GEN_ALL_CORE_FT
PRINCIPAL DISCHARGE DIAGNOSIS  Diagnosis: Dyspnea  Assessment and Plan of Treatment: You were admitted to the hospital because of new onset shortness of breath that you experienced a few days prior to admission. We performed necessary tests and imaging to rule out any life threatening causes. Your symptoms were related to an exposure, you can prevent future symptoms by staying away from things that may cause your symptoms or make them worse. Some common triggers include dust, mold, pets, pollen, cigarette smoke, getting sick with a cold or flu and stress. We have prescribed you an inhaler you can take as needed over the next few days to help control you symptoms. Please see your doctor if the symptoms do not improve or get worse after using a quick-relief medicine. If the symptoms are severe, call for an ambulance.

## 2023-05-24 LAB — DSDNA AB SER-ACNC: <12 IU/ML — SIGNIFICANT CHANGE UP

## 2023-05-25 LAB
ANA PAT FLD IF-IMP: ABNORMAL
ANA PATTERN 2: ABNORMAL
ANA TITR SER: ABNORMAL
ANTI NUCLEAR FACTOR TITER 2: ABNORMAL
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
## 2023-05-26 DIAGNOSIS — Z79.899 OTHER LONG TERM (CURRENT) DRUG THERAPY: ICD-10-CM

## 2023-05-26 DIAGNOSIS — M94.0 CHONDROCOSTAL JUNCTION SYNDROME [TIETZE]: ICD-10-CM

## 2023-05-26 DIAGNOSIS — M06.9 RHEUMATOID ARTHRITIS, UNSPECIFIED: ICD-10-CM

## 2023-05-26 DIAGNOSIS — T75.89XA OTHER SPECIFIED EFFECTS OF EXTERNAL CAUSES, INITIAL ENCOUNTER: ICD-10-CM

## 2023-05-26 DIAGNOSIS — M79.7 FIBROMYALGIA: ICD-10-CM

## 2023-05-26 DIAGNOSIS — Z79.52 LONG TERM (CURRENT) USE OF SYSTEMIC STEROIDS: ICD-10-CM

## 2025-05-13 NOTE — ED ADULT TRIAGE NOTE - CHIEF COMPLAINT QUOTE
"It was a pleasure to see Indy Reyes today in [facility]. She is a 89 year old female who underwent craniotomy for a left frontal meningioma with my former partner Dr. Rose.  Her family has noticed that she has had some falls recently and occasional veering or stumbling to the left.  It has been a few years since her most recent MRI so they were concerned that there might be tumor recurrence.  She returns today for further evaluation.      She denies any other specific Neurologic symptoms, other than some brief vertigo when she lays down at night.    Vitals:    05/13/25 1316   BP: 136/72   Pulse: 81   SpO2: 98%     Estimated body mass index is 24.46 kg/m  as calculated from the following:    Height as of 6/18/24: 1.562 m (5' 1.5\").    Weight as of 6/18/24: 59.7 kg (131 lb 9.6 oz).  Data Unavailable    Awake and alert  No pronator drift symmetric orbit of the hands and index fingers      Imaging: Review of her most recent MRI does not show any evidence of recurrent tumor.  There is a small left frontal meningioma that was present in 2022 that appears similar in size.  The imaging was reviewed with the patient shown to the patient in clinic today.    Assessment: Status post craniotomy for meningioma.  Stable left frontal meningioma    Plan: I think a repeat MRI in 2 to 3 years with the advisable.  Otherwise if she continues to have these issues then I  think they would be best further investigated by her primary care provider as there does not seem to be a clear CNS cause for her symptoms.     "
sent by [facility] for chest pain and sob with exertion x2 days. respirations equal and unlabored.

## 2025-06-13 NOTE — DISCHARGE NOTE PROVIDER - NSDCQMERRANDS_GEN_ALL_CORE
Received calls from Shabnam from outpatient pharmacy and [facility] informing me that a script was sent to outpatient pharmacy for iron, but [facility] did not want it at this time as pt is not discharging today. Outpatient pharmacy is not filling the prescription. It is over the counter.    No